# Patient Record
Sex: FEMALE | NOT HISPANIC OR LATINO | URBAN - METROPOLITAN AREA
[De-identification: names, ages, dates, MRNs, and addresses within clinical notes are randomized per-mention and may not be internally consistent; named-entity substitution may affect disease eponyms.]

---

## 2022-07-05 DIAGNOSIS — K62.5 BRBPR (BRIGHT RED BLOOD PER RECTUM): Primary | ICD-10-CM

## 2022-07-05 DIAGNOSIS — R10.9 ABDOMINAL PAIN: ICD-10-CM

## 2022-07-05 DIAGNOSIS — D64.9 ANEMIA: ICD-10-CM

## 2022-09-30 ENCOUNTER — TELEPHONE (OUTPATIENT)
Dept: GASTROENTEROLOGY | Facility: CLINIC | Age: 52
End: 2022-09-30
Payer: COMMERCIAL

## 2022-09-30 NOTE — TELEPHONE ENCOUNTER
----- Message from Debbie Iniguez sent at 9/29/2022  3:38 PM CDT -----  Brianna Summers stated that she's having a lot of stomach issues and need medication called out to her local pharmacy. She said she also need to reschedule her colonoscopy. She would like a call back before the office close today [phone]. Patient said she called previously but haven't heard back from anyone.

## 2022-10-10 ENCOUNTER — OFFICE VISIT (OUTPATIENT)
Dept: GASTROENTEROLOGY | Facility: CLINIC | Age: 52
End: 2022-10-10
Payer: COMMERCIAL

## 2022-10-10 VITALS
DIASTOLIC BLOOD PRESSURE: 85 MMHG | SYSTOLIC BLOOD PRESSURE: 121 MMHG | OXYGEN SATURATION: 95 % | BODY MASS INDEX: 34.39 KG/M2 | WEIGHT: 214 LBS | HEIGHT: 66 IN | HEART RATE: 72 BPM

## 2022-10-10 DIAGNOSIS — R10.9 ABDOMINAL PAIN: ICD-10-CM

## 2022-10-10 DIAGNOSIS — R11.0 NAUSEA: ICD-10-CM

## 2022-10-10 DIAGNOSIS — K21.9 GASTROESOPHAGEAL REFLUX DISEASE, UNSPECIFIED WHETHER ESOPHAGITIS PRESENT: Primary | ICD-10-CM

## 2022-10-10 DIAGNOSIS — R13.19 ESOPHAGEAL DYSPHAGIA: ICD-10-CM

## 2022-10-10 DIAGNOSIS — K92.1 HEMATOCHEZIA: ICD-10-CM

## 2022-10-10 DIAGNOSIS — R10.13 EPIGASTRIC PAIN: ICD-10-CM

## 2022-10-10 DIAGNOSIS — D64.9 ANEMIA: ICD-10-CM

## 2022-10-10 DIAGNOSIS — K62.5 BRBPR (BRIGHT RED BLOOD PER RECTUM): ICD-10-CM

## 2022-10-10 DIAGNOSIS — Z87.11 HISTORY OF PEPTIC ULCER DISEASE: ICD-10-CM

## 2022-10-10 DIAGNOSIS — Z79.1 NSAID LONG-TERM USE: ICD-10-CM

## 2022-10-10 DIAGNOSIS — R10.12 LEFT UPPER QUADRANT PAIN: ICD-10-CM

## 2022-10-10 PROCEDURE — 1160F RVW MEDS BY RX/DR IN RCRD: CPT | Mod: CPTII,S$GLB,, | Performed by: INTERNAL MEDICINE

## 2022-10-10 PROCEDURE — 3074F SYST BP LT 130 MM HG: CPT | Mod: CPTII,S$GLB,, | Performed by: INTERNAL MEDICINE

## 2022-10-10 PROCEDURE — 3079F DIAST BP 80-89 MM HG: CPT | Mod: CPTII,S$GLB,, | Performed by: INTERNAL MEDICINE

## 2022-10-10 PROCEDURE — 3074F PR MOST RECENT SYSTOLIC BLOOD PRESSURE < 130 MM HG: ICD-10-PCS | Mod: CPTII,S$GLB,, | Performed by: INTERNAL MEDICINE

## 2022-10-10 PROCEDURE — 1159F MED LIST DOCD IN RCRD: CPT | Mod: CPTII,S$GLB,, | Performed by: INTERNAL MEDICINE

## 2022-10-10 PROCEDURE — 99214 OFFICE O/P EST MOD 30 MIN: CPT | Mod: S$GLB,,, | Performed by: INTERNAL MEDICINE

## 2022-10-10 PROCEDURE — 3079F PR MOST RECENT DIASTOLIC BLOOD PRESSURE 80-89 MM HG: ICD-10-PCS | Mod: CPTII,S$GLB,, | Performed by: INTERNAL MEDICINE

## 2022-10-10 PROCEDURE — 3008F PR BODY MASS INDEX (BMI) DOCUMENTED: ICD-10-PCS | Mod: CPTII,S$GLB,, | Performed by: INTERNAL MEDICINE

## 2022-10-10 PROCEDURE — 3008F BODY MASS INDEX DOCD: CPT | Mod: CPTII,S$GLB,, | Performed by: INTERNAL MEDICINE

## 2022-10-10 PROCEDURE — 99214 PR OFFICE/OUTPT VISIT, EST, LEVL IV, 30-39 MIN: ICD-10-PCS | Mod: S$GLB,,, | Performed by: INTERNAL MEDICINE

## 2022-10-10 PROCEDURE — 1160F PR REVIEW ALL MEDS BY PRESCRIBER/CLIN PHARMACIST DOCUMENTED: ICD-10-PCS | Mod: CPTII,S$GLB,, | Performed by: INTERNAL MEDICINE

## 2022-10-10 PROCEDURE — 1159F PR MEDICATION LIST DOCUMENTED IN MEDICAL RECORD: ICD-10-PCS | Mod: CPTII,S$GLB,, | Performed by: INTERNAL MEDICINE

## 2022-10-10 RX ORDER — ESOMEPRAZOLE MAGNESIUM 40 MG/1
40 CAPSULE, DELAYED RELEASE ORAL
COMMUNITY
End: 2022-12-06

## 2022-10-10 RX ORDER — FAMOTIDINE 20 MG/1
20 TABLET, FILM COATED ORAL 2 TIMES DAILY
COMMUNITY
Start: 2022-09-13 | End: 2022-12-06

## 2022-10-10 RX ORDER — PROPRANOLOL HYDROCHLORIDE 20 MG/1
20 TABLET ORAL 2 TIMES DAILY
COMMUNITY
Start: 2022-07-13

## 2022-10-10 RX ORDER — ALBUTEROL SULFATE 90 UG/1
AEROSOL, METERED RESPIRATORY (INHALATION)
COMMUNITY
Start: 2022-09-28

## 2022-10-10 RX ORDER — TERBINAFINE HYDROCHLORIDE 250 MG/1
TABLET ORAL
COMMUNITY
Start: 2022-08-09 | End: 2023-09-26

## 2022-10-10 RX ORDER — SPIRONOLACTONE 25 MG/1
TABLET ORAL
COMMUNITY
Start: 2022-10-08

## 2022-10-10 RX ORDER — HYDROGEN PEROXIDE 3 %
40 SOLUTION, NON-ORAL MISCELLANEOUS 2 TIMES DAILY
COMMUNITY
End: 2022-10-10

## 2022-10-10 NOTE — LETTER
October 10, 2022        Ivania Jackson MD  [address] [facility] [address]             [facility]  [address] [place] [address]  Phone: [phone]  Fax: [phone]   Patient: Brianna Summers   MR Number: [id number]   YOB: 1970   Date of Visit: 10/10/2022       Dear Dr. Jackson:    Thank you for referring Brianna Summers to me for evaluation. Attached you will find relevant portions of my assessment and plan of care.    If you have questions, please do not hesitate to call me. I look forward to following Brianna Summers along with you.    Sincerely,      Izabela Carroll MD            CC  No Recipients    Enclosure

## 2022-10-10 NOTE — PATIENT INSTRUCTIONS
Schedule upper and lower endoscopies. Notify our office if you need a new colonoscopy prep sent to pharmacy.     Please notify my office if you have not been contacted within two weeks after any procedures, submitting any samples (biopsies, blood, stool, urine, etc.) or after any imaging (X-ray, CT, MRI, etc.).

## 2022-10-10 NOTE — PROGRESS NOTES
Clinic Note    Reason for visit:  The primary encounter diagnosis was Gastroesophageal reflux disease, unspecified whether esophagitis present. Diagnoses of Left upper quadrant pain, Hematochezia, Epigastric pain, Nausea, History of peptic ulcer disease, NSAID long-term use, and Esophageal dysphagia were also pertinent to this visit.    PCP: Ivania Jackson       HPI:  This is a 52 y.o. female who was last seen 3/2021. Patient reports having LUQ tenderness last week that has resolved. Patient takes excedrin and ibuprofen, 1 of each daily for migraines. She states h/o PUD. She also reports if she does not eat by a certain time she will have nausea. She takes esomeprazole 40 daily and famotidine 20 BID. If misses a day of esomeprazole 40, she will have symptoms. She also reports intermittent dysphagia with food. Last EGD/Colonoscopy many years ago. No diarrhea or constipation.    Had GB removed ~25 years ago and GI issues improved (nausea/vomiting after eating).  Had a bowel blockage [id number] and had part of her colon removed. Having rectal bleeding for years.     Referral notes from 9/2021: [facility]. Anemia. Multiple colons in past. Bowel obs, 2005, s/p partial colon resection. H/o bleeding . 9/2021 B12/fol/LD/retic/CBC/chol nl, Hb 12.3 < 11.8L, MCV 95.4.    Review of Systems   Constitutional:  Negative for chills, diaphoresis, fatigue, fever and unexpected weight change.   HENT:  Positive for sore throat and trouble swallowing. Negative for mouth sores, nosebleeds, postnasal drip and voice change.    Eyes:  Negative for pain, discharge and eye dryness.   Respiratory:  Positive for chest tightness, shortness of breath and wheezing. Negative for apnea, cough and choking.    Cardiovascular:  Positive for leg swelling and claudication. Negative for chest pain and palpitations.   Gastrointestinal:  Positive for abdominal distention, abdominal pain, anal bleeding, blood in stool, nausea and reflux. Negative for change in  bowel habit, constipation, diarrhea, rectal pain, vomiting, fecal incontinence and change in bowel habit.   Genitourinary:  Negative for bladder incontinence, difficulty urinating, dysuria, flank pain, frequency and hematuria.   Musculoskeletal:  Positive for joint swelling. Negative for arthralgias, back pain and joint deformity.   Integumentary:  Positive for color change. Negative for rash and wound.   Allergic/Immunologic: Negative for environmental allergies and food allergies.   Neurological:  Positive for headaches. Negative for seizures, facial asymmetry, speech difficulty, weakness and memory loss.   Hematological:  Negative for adenopathy. Does not bruise/bleed easily.   Psychiatric/Behavioral:  Negative for agitation, behavioral problems, confusion, hallucinations and sleep disturbance.       Past Medical History:   Diagnosis Date    Bleeding ulcer     BMI 34.0-34.9,adult     Bowel obstruction     GERD (gastroesophageal reflux disease)     High blood pressure     Migraines      Past Surgical History:   Procedure Laterality Date     SECTION      twice    CHOLECYSTECTOMY      COLECTOMY  2005    partial, bowel obstruction    COLONOSCOPY      HYSTERECTOMY      TONSILLECTOMY AND ADENOIDECTOMY      UPPER GASTROINTESTINAL ENDOSCOPY       Family History   Problem Relation Age of Onset    Throat cancer Father     Lung cancer Father     Esophageal cancer Father     Ulcerative colitis Sister     Crohn's disease Maternal Aunt     Liver cancer Maternal Grandmother     Tuberculosis Maternal Grandfather     Colon cancer Neg Hx      Social History     Tobacco Use    Smoking status: Never    Smokeless tobacco: Never   Substance Use Topics    Alcohol use: Never    Drug use: Never     Review of patient's allergies indicates:   Allergen Reactions    Imitrex [sumatriptan]         Medication List with Changes/Refills   Current Medications    ALBUTEROL (PROVENTIL/VENTOLIN HFA) 90 MCG/ACTUATION INHALER         "ESOMEPRAZOLE (NEXIUM) 40 MG CAPSULE    Take 40 mg by mouth before breakfast.    FAMOTIDINE (PEPCID) 20 MG TABLET    Take 20 mg by mouth 2 (two) times a day.    PROPRANOLOL (INDERAL) 20 MG TABLET    Take 20 mg by mouth 2 (two) times daily.    SPIRONOLACTONE (ALDACTONE) 25 MG TABLET        TERBINAFINE HCL (LAMISIL) 250 MG TABLET    TTAKE 1 TABLET BY MOUTH DAILY   Discontinued Medications    ESOMEPRAZOLE (NEXIUM) 20 MG CAPSULE    Take 40 mg by mouth 2 (two) times daily.         Vital Signs:  /85   Pulse 72   Ht 5' 6" (1.676 m)   Wt 97.1 kg (214 lb)   SpO2 95%   BMI 34.54 kg/m²         Physical Exam  Vitals reviewed.   Constitutional:       General: She is awake. She is not in acute distress.     Appearance: Normal appearance. She is well-developed. She is not ill-appearing, toxic-appearing or diaphoretic.   HENT:      Head: Normocephalic and atraumatic.      Nose: Nose normal.      Mouth/Throat:      Mouth: Mucous membranes are moist.      Pharynx: Oropharynx is clear. No oropharyngeal exudate or posterior oropharyngeal erythema.   Eyes:      General: Lids are normal. Gaze aligned appropriately. No scleral icterus.        Right eye: No discharge.         Left eye: No discharge.      Extraocular Movements: Extraocular movements intact.      Conjunctiva/sclera: Conjunctivae normal.   Neck:      Trachea: Trachea normal.   Cardiovascular:      Rate and Rhythm: Normal rate and regular rhythm.      Pulses:           Radial pulses are 2+ on the right side and 2+ on the left side.   Pulmonary:      Effort: Pulmonary effort is normal. No respiratory distress.      Breath sounds: Normal breath sounds. No stridor. No wheezing or rhonchi.   Chest:      Chest wall: No tenderness.   Abdominal:      General: Bowel sounds are normal. There is no distension.      Palpations: Abdomen is soft. There is no fluid wave, hepatomegaly or mass.      Tenderness: There is no abdominal tenderness. There is no guarding or rebound. "   Musculoskeletal:         General: No tenderness or deformity.      Cervical back: Full passive range of motion without pain and neck supple. No tenderness.      Right lower leg: No edema.      Left lower leg: No edema.   Lymphadenopathy:      Cervical: No cervical adenopathy.   Skin:     General: Skin is warm and dry.      Capillary Refill: Capillary refill takes less than 2 seconds.      Coloration: Skin is not cyanotic, jaundiced or pale.      Findings: No rash.   Neurological:      General: No focal deficit present.      Mental Status: She is alert and oriented to person, place, and time.      Cranial Nerves: No facial asymmetry.      Motor: No tremor.   Psychiatric:         Attention and Perception: Attention normal.         Mood and Affect: Mood and affect normal.         Speech: Speech normal.         Behavior: Behavior normal. Behavior is cooperative.          All of the data above and below has been reviewed by myself and any further interpretations will be reflected in the assessment and plan.   The data includes review of external notes, and independent interpretation of lab results, procedures, x-rays, and imaging reports.      Assessment:  Gastroesophageal reflux disease, unspecified whether esophagitis present    Left upper quadrant pain    Hematochezia  -     Ambulatory Referral to External Surgery    Epigastric pain    Nausea    History of peptic ulcer disease  -     Ambulatory Referral to External Surgery    NSAID long-term use    Esophageal dysphagia  -     Ambulatory Referral to External Surgery    Ulcer v outlet bleeding v infl v isch v H. Pylori. Plan for EGD/Colon with E/G/D biopsies. Consider EM if EGD unrevealing as source of dysphagia.      Recommendations:  Schedule upper and lower endoscopies at [facility] with sutab. Notify our office if you need a new colonoscopy prep sent to pharmacy.     Risks, benefits, and alternatives of medical management, any associated procedures, and/or treatment  discussed with the patient. Patient given opportunity to ask questions and voices understanding. Patient has elected to proceed with the recommended care modalities as discussed.    Follow up in about 6 months (around 4/10/2023).    Order summary:  Orders Placed This Encounter   Procedures    Ambulatory Referral to External Surgery          Instructed patient to notify my office if they have not been contacted within two weeks after any procedures, submitting any samples (biopsies, blood, stool, urine, etc.) or after any imaging (X-ray, CT, MRI, etc.).     Izabela Carroll MD    This document may have been created using a voice recognition transcribing system. Incorrect words or phrases may have been missed during proofreading. Please interpret accordingly or contact me for clarification.

## 2022-11-30 ENCOUNTER — TELEPHONE (OUTPATIENT)
Dept: GASTROENTEROLOGY | Facility: CLINIC | Age: 52
End: 2022-11-30
Payer: COMMERCIAL

## 2022-11-30 ENCOUNTER — TELEPHONE (OUTPATIENT)
Dept: ADMINISTRATIVE | Facility: CLINIC | Age: 52
End: 2022-11-30
Payer: COMMERCIAL

## 2022-11-30 VITALS — WEIGHT: 214 LBS | HEIGHT: 66 IN | BODY MASS INDEX: 34.39 KG/M2

## 2022-11-30 DIAGNOSIS — K92.1 HEMATOCHEZIA: Primary | ICD-10-CM

## 2022-11-30 DIAGNOSIS — Z87.11 HISTORY OF PEPTIC ULCER DISEASE: ICD-10-CM

## 2022-11-30 DIAGNOSIS — R13.19 ESOPHAGEAL DYSPHAGIA: ICD-10-CM

## 2022-11-30 NOTE — TELEPHONE ENCOUNTER
[facility]  [address] [place] [address]  Phone: [phone]  Fax: [phone]    History & Physical         Provider: Dr. Izabela Carroll    Patient Name: Brianna Summers DOB (age):1970  52 y.o.           Gender: female   Phone: [phone]     Referring Physician: Ivania Jackson     Vital Signs:   Height - 5'6  Weight - 214 lb   BMI -  34.54    Plan: EGD/ Colonoscopy @ [facility]     Encounter Diagnoses   Name Primary?    Hematochezia Yes    History of peptic ulcer disease     Esophageal dysphagia            History:      Past Medical History:   Diagnosis Date    Bleeding ulcer     BMI 34.0-34.9,adult     BMI 34.0-34.9,adult     Bowel obstruction     GERD (gastroesophageal reflux disease)     High blood pressure     Migraines       Past Surgical History:   Procedure Laterality Date     SECTION      twice    CHOLECYSTECTOMY      COLECTOMY      partial, bowel obstruction    COLONOSCOPY      HYSTERECTOMY      TONSILLECTOMY AND ADENOIDECTOMY      UPPER GASTROINTESTINAL ENDOSCOPY        Medication List with Changes/Refills   Current Medications    ALBUTEROL (PROVENTIL/VENTOLIN HFA) 90 MCG/ACTUATION INHALER        ESOMEPRAZOLE (NEXIUM) 40 MG CAPSULE    Take 40 mg by mouth before breakfast.    FAMOTIDINE (PEPCID) 20 MG TABLET    Take 20 mg by mouth 2 (two) times a day.    PROPRANOLOL (INDERAL) 20 MG TABLET    Take 20 mg by mouth 2 (two) times daily.    SPIRONOLACTONE (ALDACTONE) 25 MG TABLET        TERBINAFINE HCL (LAMISIL) 250 MG TABLET    TTAKE 1 TABLET BY MOUTH DAILY      Review of patient's allergies indicates:   Allergen Reactions    Imitrex [sumatriptan]       Family History   Problem Relation Age of Onset    Throat cancer Father     Lung cancer Father     Esophageal cancer Father     Ulcerative colitis Sister     Crohn's disease Maternal Aunt     Liver cancer Maternal Grandmother      Tuberculosis Maternal Grandfather     Colon cancer Neg Hx       Social History     Tobacco Use    Smoking status: Never    Smokeless tobacco: Never   Substance Use Topics    Alcohol use: Never    Drug use: Never        Physical Examination:     General Appearance:___________________________  HEENT: _____________________________________  Abdomen:____________________________________  Heart:________________________________________  Lungs:_______________________________________  Extremities:___________________________________  Skin:_________________________________________  Endocrine:____________________________________  Genitourinary:_________________________________  Neurological:__________________________________      Patient has been evaluated immediately prior to sedation and is medically cleared for endoscopy with IVCS as an ASA class: ______      Physician Signature: _________________________       Date: ________  Time: ________

## 2022-12-06 ENCOUNTER — TELEPHONE (OUTPATIENT)
Dept: GASTROENTEROLOGY | Facility: CLINIC | Age: 52
End: 2022-12-06

## 2022-12-06 ENCOUNTER — OUTSIDE PLACE OF SERVICE (OUTPATIENT)
Dept: GASTROENTEROLOGY | Facility: CLINIC | Age: 52
End: 2022-12-06

## 2022-12-06 DIAGNOSIS — K21.9 GASTROESOPHAGEAL REFLUX DISEASE, UNSPECIFIED WHETHER ESOPHAGITIS PRESENT: Primary | ICD-10-CM

## 2022-12-06 LAB
CRC RECOMMENDATION EXT: NORMAL
EGD FOLLOW UP EXTERNAL: NORMAL

## 2022-12-06 PROCEDURE — 45378 DIAGNOSTIC COLONOSCOPY: CPT | Mod: ,,, | Performed by: INTERNAL MEDICINE

## 2022-12-06 PROCEDURE — 43239 EGD BIOPSY SINGLE/MULTIPLE: CPT | Mod: ,,, | Performed by: INTERNAL MEDICINE

## 2022-12-06 PROCEDURE — 45378 PR COLONOSCOPY,DIAGNOSTIC: ICD-10-PCS | Mod: ,,, | Performed by: INTERNAL MEDICINE

## 2022-12-06 PROCEDURE — 43239 PR EGD, FLEX, W/BIOPSY, SGL/MULTI: ICD-10-PCS | Mod: ,,, | Performed by: INTERNAL MEDICINE

## 2022-12-06 RX ORDER — PANTOPRAZOLE SODIUM 40 MG/1
40 TABLET, DELAYED RELEASE ORAL 2 TIMES DAILY
Qty: 180 TABLET | Refills: 2 | Status: SHIPPED | OUTPATIENT
Start: 2022-12-06

## 2022-12-07 LAB — SPECIMEN TO PATHOLOGY: NORMAL

## 2022-12-09 ENCOUNTER — TELEPHONE (OUTPATIENT)
Dept: GASTROENTEROLOGY | Facility: CLINIC | Age: 52
End: 2022-12-09
Payer: COMMERCIAL

## 2022-12-09 NOTE — TELEPHONE ENCOUNTER
LVM with procedure results and medication use.   Also let pt know that someone will be reaching out to her to get her scheduled for EGD @ [facility] with NBP.

## 2022-12-09 NOTE — TELEPHONE ENCOUNTER
DBx mini act ditis, GBx react w/mini chr inact gitis w/o Hp, EBx reflux.  Notify patient. No signs of infection, precancerous cells or BE on the upper endoscopy biopsies.  She was to attempt to stop NSAID use and begin panto 40 BID (no need for esome or pepcid on panto BID).  Repeat EGD next available at [facility].  NBP

## 2022-12-14 ENCOUNTER — TELEPHONE (OUTPATIENT)
Dept: GASTROENTEROLOGY | Facility: CLINIC | Age: 52
End: 2022-12-14
Payer: COMMERCIAL

## 2022-12-14 NOTE — TELEPHONE ENCOUNTER
Called pt to reschedule her EGD/Colon and pt stated that she has already had it. I apologized for the miscommunication. valeria

## 2022-12-14 NOTE — TELEPHONE ENCOUNTER
----- Message from Vale Zhou LPN sent at 11/7/2022  4:15 PM CST -----  Regarding: FW: Cancellation  Contact: patient    ----- Message -----  From: Apple Massey  Sent: 11/7/2022   3:45 PM CST  To: [facility]OWE[facility] Staff  Subject: Cancellation                                     Per phone call with patient, she stated that she would like to cancel the appointment for the colonoscopy on 12/13/2022 and she will call back and reschedule.  Please return call at [phone] (work).    Thanks,  SJ

## 2022-12-15 NOTE — TELEPHONE ENCOUNTER
Reached out to patient l/m for her to call back to get scheduled for a egd @ [facility]. [place]

## 2023-01-30 ENCOUNTER — DOCUMENTATION ONLY (OUTPATIENT)
Dept: GASTROENTEROLOGY | Facility: CLINIC | Age: 53
End: 2023-01-30
Payer: COMMERCIAL

## 2023-09-26 ENCOUNTER — OFFICE VISIT (OUTPATIENT)
Dept: OBSTETRICS AND GYNECOLOGY | Facility: CLINIC | Age: 53
End: 2023-09-26
Payer: COMMERCIAL

## 2023-09-26 VITALS
HEIGHT: 66 IN | WEIGHT: 218 LBS | SYSTOLIC BLOOD PRESSURE: 149 MMHG | BODY MASS INDEX: 35.03 KG/M2 | HEART RATE: 73 BPM | DIASTOLIC BLOOD PRESSURE: 89 MMHG

## 2023-09-26 DIAGNOSIS — N76.2 ACUTE VULVITIS: Primary | ICD-10-CM

## 2023-09-26 PROCEDURE — 3077F PR MOST RECENT SYSTOLIC BLOOD PRESSURE >= 140 MM HG: ICD-10-PCS | Mod: CPTII,S$GLB,, | Performed by: OBSTETRICS & GYNECOLOGY

## 2023-09-26 PROCEDURE — 3008F BODY MASS INDEX DOCD: CPT | Mod: CPTII,S$GLB,, | Performed by: OBSTETRICS & GYNECOLOGY

## 2023-09-26 PROCEDURE — 3079F PR MOST RECENT DIASTOLIC BLOOD PRESSURE 80-89 MM HG: ICD-10-PCS | Mod: CPTII,S$GLB,, | Performed by: OBSTETRICS & GYNECOLOGY

## 2023-09-26 PROCEDURE — 99203 PR OFFICE/OUTPT VISIT, NEW, LEVL III, 30-44 MIN: ICD-10-PCS | Mod: S$GLB,,, | Performed by: OBSTETRICS & GYNECOLOGY

## 2023-09-26 PROCEDURE — 3079F DIAST BP 80-89 MM HG: CPT | Mod: CPTII,S$GLB,, | Performed by: OBSTETRICS & GYNECOLOGY

## 2023-09-26 PROCEDURE — 3008F PR BODY MASS INDEX (BMI) DOCUMENTED: ICD-10-PCS | Mod: CPTII,S$GLB,, | Performed by: OBSTETRICS & GYNECOLOGY

## 2023-09-26 PROCEDURE — 99203 OFFICE O/P NEW LOW 30 MIN: CPT | Mod: S$GLB,,, | Performed by: OBSTETRICS & GYNECOLOGY

## 2023-09-26 PROCEDURE — 3077F SYST BP >= 140 MM HG: CPT | Mod: CPTII,S$GLB,, | Performed by: OBSTETRICS & GYNECOLOGY

## 2023-09-26 RX ORDER — NYSTATIN AND TRIAMCINOLONE ACETONIDE 100000; 1 [USP'U]/G; MG/G
CREAM TOPICAL
Qty: 30 G | Refills: 3 | Status: SHIPPED | OUTPATIENT
Start: 2023-09-26 | End: 2024-09-25

## 2023-09-26 RX ORDER — ESTRADIOL 0.1 MG/G
1 CREAM VAGINAL DAILY
Qty: 42.5 G | Refills: 3 | Status: SHIPPED | OUTPATIENT
Start: 2023-09-26 | End: 2024-09-25

## 2023-09-26 RX ORDER — FLUCONAZOLE 150 MG/1
150 TABLET ORAL DAILY
Qty: 3 TABLET | Refills: 0 | Status: SHIPPED | OUTPATIENT
Start: 2023-09-26 | End: 2023-10-29

## 2023-09-26 NOTE — PROGRESS NOTES
Subjective     Patient ID: Brianna Summers is a 53 y.o. female.    Chief Complaint: Vaginal Pain (Blisters, irritation)    Is a 53-year-old female here for evaluation of irritation on her vulva said is begun couple months ago said it is so painful she can not have intercourse at times and swells and cracks sees white areas a often on patient denies any history of diabetes however whole family is diabetic spent 30 minutes with the patient face-to-face discussing her problem and diagnosing her problem    Vaginal Pain  The patient's primary symptoms include pelvic pain.     Review of Systems   Genitourinary:  Positive for genital sores, pelvic pain and vaginal pain.          Objective     Physical Exam  Constitutional:       Appearance: She is well-developed.   HENT:      Head: Normocephalic.      Nose: Nose normal.   Eyes:      Conjunctiva/sclera: Conjunctivae normal.      Pupils: Pupils are equal, round, and reactive to light.   Cardiovascular:      Rate and Rhythm: Normal rate and regular rhythm.      Heart sounds: Normal heart sounds.   Pulmonary:      Effort: Pulmonary effort is normal.      Breath sounds: Normal breath sounds.   Abdominal:      General: Bowel sounds are normal.      Palpations: Abdomen is soft.   Genitourinary:     Vagina: Normal.          Comments: A whole vulva is erythematous there is some agglutination of the labia minora to the majora and also the periclitoral area upper vagina is normal  Musculoskeletal:         General: Normal range of motion.      Cervical back: Normal range of motion and neck supple.   Skin:     General: Skin is warm and dry.   Neurological:      Mental Status: She is alert and oriented to person, place, and time.   Psychiatric:         Behavior: Behavior normal.         Thought Content: Thought content normal.          Assessment and Plan     1. Acute vulvitis  -     TSH w/reflex to FT4; Future; Expected date: 09/26/2023  -     Glucose, Random; Future; Expected  date: 09/26/2023             Will give the patient 3 Diflucan 1 every 3 days Mycolog b.i.d. and estradiol cream HS    No follow-ups on file.

## 2023-09-27 DIAGNOSIS — N76.2 ACUTE VULVITIS: Primary | ICD-10-CM

## 2023-09-28 ENCOUNTER — TELEPHONE (OUTPATIENT)
Dept: OBSTETRICS AND GYNECOLOGY | Facility: CLINIC | Age: 53
End: 2023-09-28
Payer: COMMERCIAL

## 2023-09-28 NOTE — TELEPHONE ENCOUNTER
----- Message from Candelaria Stewart sent at 9/27/2023  4:39 PM CDT -----  Contact: self  Type:  Patient Returning Call    Who Called:Brianna Summers   Who Left Message for Patient: She's unsure  Does the patient know what this is regarding?: Possibly about medication needing authorization  Would the patient rather a call back or a response via [facility]? Call back  Best Call Back Number: [phone]   Additional Information: n/a

## 2023-10-03 RX ORDER — NYSTATIN 100000 U/G
CREAM TOPICAL 2 TIMES DAILY
Qty: 30 G | Refills: 0 | Status: SHIPPED | OUTPATIENT
Start: 2023-10-03

## 2023-10-06 LAB
GLUCOSE: 99 MG/DL (ref 74–106)
TSH W/REFLEX TO FT4: 0.63 UIU/ML (ref 0.27–4.2)

## 2023-10-17 ENCOUNTER — OFFICE VISIT (OUTPATIENT)
Dept: OBSTETRICS AND GYNECOLOGY | Facility: CLINIC | Age: 53
End: 2023-10-17
Payer: COMMERCIAL

## 2023-10-17 VITALS
DIASTOLIC BLOOD PRESSURE: 76 MMHG | HEART RATE: 65 BPM | BODY MASS INDEX: 35.19 KG/M2 | WEIGHT: 218 LBS | SYSTOLIC BLOOD PRESSURE: 115 MMHG

## 2023-10-17 DIAGNOSIS — N76.2 ACUTE VULVITIS: Primary | ICD-10-CM

## 2023-10-17 PROCEDURE — 3078F PR MOST RECENT DIASTOLIC BLOOD PRESSURE < 80 MM HG: ICD-10-PCS | Mod: CPTII,S$GLB,, | Performed by: OBSTETRICS & GYNECOLOGY

## 2023-10-17 PROCEDURE — 3074F PR MOST RECENT SYSTOLIC BLOOD PRESSURE < 130 MM HG: ICD-10-PCS | Mod: CPTII,S$GLB,, | Performed by: OBSTETRICS & GYNECOLOGY

## 2023-10-17 PROCEDURE — 1159F PR MEDICATION LIST DOCUMENTED IN MEDICAL RECORD: ICD-10-PCS | Mod: CPTII,S$GLB,, | Performed by: OBSTETRICS & GYNECOLOGY

## 2023-10-17 PROCEDURE — 1159F MED LIST DOCD IN RCRD: CPT | Mod: CPTII,S$GLB,, | Performed by: OBSTETRICS & GYNECOLOGY

## 2023-10-17 PROCEDURE — 3074F SYST BP LT 130 MM HG: CPT | Mod: CPTII,S$GLB,, | Performed by: OBSTETRICS & GYNECOLOGY

## 2023-10-17 PROCEDURE — 99213 OFFICE O/P EST LOW 20 MIN: CPT | Mod: S$GLB,,, | Performed by: OBSTETRICS & GYNECOLOGY

## 2023-10-17 PROCEDURE — 3078F DIAST BP <80 MM HG: CPT | Mod: CPTII,S$GLB,, | Performed by: OBSTETRICS & GYNECOLOGY

## 2023-10-17 PROCEDURE — 99213 PR OFFICE/OUTPT VISIT, EST, LEVL III, 20-29 MIN: ICD-10-PCS | Mod: S$GLB,,, | Performed by: OBSTETRICS & GYNECOLOGY

## 2023-10-17 PROCEDURE — 3008F PR BODY MASS INDEX (BMI) DOCUMENTED: ICD-10-PCS | Mod: CPTII,S$GLB,, | Performed by: OBSTETRICS & GYNECOLOGY

## 2023-10-17 PROCEDURE — 3008F BODY MASS INDEX DOCD: CPT | Mod: CPTII,S$GLB,, | Performed by: OBSTETRICS & GYNECOLOGY

## 2023-10-17 NOTE — PROGRESS NOTES
Patient is here follow-up significant vulvitis she said she feels 100% better just has 1 area that is still little irritated on exam she has a little tiny fissure Autumn clitoral other net vulvitis completely gone I asked her to change at least a soap S this the only thing she is using has this responded like a chronic

## 2024-07-29 DIAGNOSIS — K21.9 GASTROESOPHAGEAL REFLUX DISEASE, UNSPECIFIED WHETHER ESOPHAGITIS PRESENT: ICD-10-CM

## 2024-08-01 RX ORDER — PANTOPRAZOLE SODIUM 40 MG/1
40 TABLET, DELAYED RELEASE ORAL 2 TIMES DAILY
Qty: 180 TABLET | Refills: 2 | OUTPATIENT
Start: 2024-08-01

## 2024-08-01 NOTE — TELEPHONE ENCOUNTER
"Staff spoke to pt.. I advised her we received the rf req for her medication but she haven't been seen since 2022.. I asked her was she still taking the med and she stated "yes".    I asked her who was refilling the medication and she said "Ashkan gave me a year refill on it".  I advised her none of the scripts are refilled for a year. She then stated "maybe my other dr refilled it"  I advised her if she needed an appt if she wanted the medication refilled.. she said "no I dont need and appt"  I told her ok so you are saying you dont want to be seen anymore? She stated "no I dont want an appt"  I said ok thank you have a good day.   "

## 2024-10-15 ENCOUNTER — TELEPHONE (OUTPATIENT)
Dept: GASTROENTEROLOGY | Facility: CLINIC | Age: 54
End: 2024-10-15
Payer: COMMERCIAL

## 2024-10-15 NOTE — TELEPHONE ENCOUNTER
----- Message from Maria Del Rosario sent at 10/15/2024  8:48 AM CDT -----  States she needs to schedule an EGD and Colonoscopy. Please call pt  [phone]. Thank you

## 2024-10-15 NOTE — TELEPHONE ENCOUNTER
Called and spoke with patient. OV for egd/colonoscopy consult scheduled on 12/18/2024 at 3:00 pm w/ [facility]. DMP

## 2024-12-18 ENCOUNTER — OFFICE VISIT (OUTPATIENT)
Dept: GASTROENTEROLOGY | Facility: CLINIC | Age: 54
End: 2024-12-18
Payer: COMMERCIAL

## 2024-12-18 VITALS
WEIGHT: 207.81 LBS | SYSTOLIC BLOOD PRESSURE: 118 MMHG | OXYGEN SATURATION: 96 % | BODY MASS INDEX: 33.4 KG/M2 | HEART RATE: 73 BPM | HEIGHT: 66 IN | DIASTOLIC BLOOD PRESSURE: 74 MMHG

## 2024-12-18 DIAGNOSIS — R10.13 EPIGASTRIC PAIN: ICD-10-CM

## 2024-12-18 DIAGNOSIS — D64.9 ANEMIA, UNSPECIFIED TYPE: ICD-10-CM

## 2024-12-18 DIAGNOSIS — K25.9 GASTRIC ULCER, UNSPECIFIED CHRONICITY, UNSPECIFIED WHETHER GASTRIC ULCER HEMORRHAGE OR PERFORATION PRESENT: Primary | ICD-10-CM

## 2024-12-18 DIAGNOSIS — K21.9 GASTROESOPHAGEAL REFLUX DISEASE, UNSPECIFIED WHETHER ESOPHAGITIS PRESENT: ICD-10-CM

## 2024-12-18 DIAGNOSIS — Z12.11 SCREENING FOR COLON CANCER: ICD-10-CM

## 2024-12-18 DIAGNOSIS — Z79.1 NSAID LONG-TERM USE: ICD-10-CM

## 2024-12-18 PROCEDURE — 1159F MED LIST DOCD IN RCRD: CPT | Mod: CPTII,,,

## 2024-12-18 PROCEDURE — 1160F RVW MEDS BY RX/DR IN RCRD: CPT | Mod: CPTII,,,

## 2024-12-18 PROCEDURE — 3008F BODY MASS INDEX DOCD: CPT | Mod: CPTII,,,

## 2024-12-18 PROCEDURE — 3078F DIAST BP <80 MM HG: CPT | Mod: CPTII,,,

## 2024-12-18 PROCEDURE — 3074F SYST BP LT 130 MM HG: CPT | Mod: CPTII,,,

## 2024-12-18 PROCEDURE — 99215 OFFICE O/P EST HI 40 MIN: CPT | Mod: S$PBB,,,

## 2024-12-18 RX ORDER — FAMOTIDINE 20 MG/1
1 TABLET, FILM COATED ORAL DAILY
COMMUNITY
Start: 2024-09-30 | End: 2024-12-18 | Stop reason: SDUPTHER

## 2024-12-18 RX ORDER — FUROSEMIDE 40 MG/1
1 TABLET ORAL DAILY
COMMUNITY
Start: 2024-09-30

## 2024-12-18 RX ORDER — FAMOTIDINE 20 MG/1
20 TABLET, FILM COATED ORAL DAILY
Qty: 90 TABLET | Refills: 4 | Status: SHIPPED | OUTPATIENT
Start: 2024-12-18

## 2024-12-18 RX ORDER — IBUPROFEN 200 MG
200 TABLET ORAL EVERY 6 HOURS PRN
COMMUNITY

## 2024-12-18 RX ORDER — PANTOPRAZOLE SODIUM 40 MG/1
40 TABLET, DELAYED RELEASE ORAL 2 TIMES DAILY
Qty: 180 TABLET | Refills: 4 | Status: SHIPPED | OUTPATIENT
Start: 2024-12-18

## 2024-12-18 NOTE — PATIENT INSTRUCTIONS
Schedule upper endoscopy.   Continue pantoprazole 40 mg twice a day. Continue famotidine 20 mg daily.  Discontinue all NSAIDs.    Please notify my office if you have not been contacted within two weeks after any procedures, submitting any samples (biopsies, blood, stool, urine, etc.) or after any imaging (X-ray, CT, MRI, etc.).

## 2024-12-18 NOTE — PROGRESS NOTES
Clinic Note    Reason for visit:  The primary encounter diagnosis was Gastric ulcer, unspecified chronicity, unspecified whether gastric ulcer hemorrhage or perforation present. Diagnoses of Gastroesophageal reflux disease, unspecified whether esophagitis present, Epigastric pain, Anemia, unspecified type, Screening for colon cancer, and NSAID long-term use were also pertinent to this visit.    PCP: Ivania Jackson       HPI:  This is a 54 y.o. female who was last seen by Dr. Carroll in 2022. Patient with h/o PUD. She has 2 gastric ulcers on EGD 2022. She was taking NSAIDs at that time for migraines and joint pain. She was on esomeprazole 40 mg daily with Pepcid and this was changed to pantoprazole 40 mg BID after EGD. She is still taking pantoprazole 40 mg but has been taking once daily more lately due to it not getting refilled. She takes famotidine 20 mg daily as well. This controls reflux. Has symptoms if misses one day. If she does not take reflux medicine she will also have pain in epigastric area. She has not had dark stools in a long time.   She did not have a repeat EGD. She is taking ibuprofen 400 mg BID at this time for joint pain. About 90% of the time she she is taking the ibuprofen but does go some days without it. Waiting to see Dr. Rodriguez for follow up on rheum work up.       11/26/2024: Hgb 11.9L, MCV 94, PLT 407H, Eos 7%H, CBC onl, ANCA/HCVAb/TB/HBVsAg/HBVsAb/HBVcAb neg, CRP 10.1H, ESR 21, CMP wnl    EGD/Colonoscopy 12/6/2022: Small HH, 2 gastric ulcers. DBx mini act ditis, GBx react w/mini chr inact gitis w/o Hp, EBx reflux. IH/EH. Normal TI and colonic mucosa otherwise. Repeat colonoscopy in 10 years.    Had GB removed ~25 years ago and GI issues improved (nausea/vomiting after eating).  Had a bowel blockage 8712-1738 and had part of her colon removed. Having rectal bleeding for years.      Referral notes from 9/2021: [facility]. Anemia. Multiple colons in past. Bowel obs, 2005, s/p partial colon  resection. H/o bleeding . 2021 B12/fol/LD/retic/CBC/chol nl, Hb 12.3 < 11.8L, MCV 95.4.    Review of Systems   Constitutional:  Negative for fatigue, fever and unexpected weight change.   HENT:  Negative for mouth sores, postnasal drip, sore throat and trouble swallowing.    Eyes:  Negative for pain, discharge and eye dryness.   Respiratory:  Negative for apnea, cough, choking, chest tightness, shortness of breath and wheezing.    Cardiovascular:  Positive for leg swelling. Negative for chest pain and palpitations.   Gastrointestinal:  Positive for abdominal distention, abdominal pain and nausea. Negative for anal bleeding, blood in stool, change in bowel habit, constipation, diarrhea, rectal pain, vomiting, reflux and fecal incontinence.   Genitourinary:  Negative for bladder incontinence, dysuria and hematuria.   Musculoskeletal:  Positive for back pain and joint swelling. Negative for arthralgias.   Integumentary:  Positive for color change and rash.   Allergic/Immunologic: Negative for environmental allergies and food allergies.   Neurological:  Positive for headaches. Negative for seizures.   Hematological:  Negative for adenopathy. Does not bruise/bleed easily.        Past Medical History:   Diagnosis Date    Asthma     CHF (congestive heart failure)     GERD (gastroesophageal reflux disease)     High blood pressure     History of pericarditis     Hyperparathyroidism, unspecified     Migraines     Neuropathy     Obesity, Class I, BMI 30.0-34.9 (see actual BMI)     Osteopenia     Peripheral vascular disease, unspecified     Rheumatoid arthritis, unspecified     Scoliosis     Unspecified osteoarthritis, unspecified site      Past Surgical History:   Procedure Laterality Date     SECTION      twice    CHOLECYSTECTOMY      COLECTOMY  2005    partial, bowel obstruction    COLONOSCOPY      HYSTERECTOMY      KNEE CARTILAGE SURGERY Left     TONSILLECTOMY AND ADENOIDECTOMY      UPPER GASTROINTESTINAL  "ENDOSCOPY      VENOUS ABLATION       Family History   Problem Relation Name Age of Onset    Throat cancer Father      Lung cancer Father      Esophageal cancer Father      Ulcerative colitis Sister      Crohn's disease Maternal Aunt      Liver cancer Maternal Grandmother      Tuberculosis Maternal Grandfather      Colon cancer Neg Hx       Social History     Tobacco Use    Smoking status: Never    Smokeless tobacco: Never   Substance Use Topics    Alcohol use: Never    Drug use: Never     Review of patient's allergies indicates:   Allergen Reactions    Imitrex [sumatriptan]       Medication List with Changes/Refills   Current Medications    ALBUTEROL (PROVENTIL/VENTOLIN HFA) 90 MCG/ACTUATION INHALER        ESTRADIOL (ESTRACE) 0.01 % (0.1 MG/GRAM) VAGINAL CREAM    Place 1 g vaginally once daily.    FUROSEMIDE (LASIX) 40 MG TABLET    Take 1 tablet by mouth once daily.    IBUPROFEN (ADVIL,MOTRIN) 200 MG TABLET    Take 200 mg by mouth every 6 (six) hours as needed.    NYSTATIN-TRIAMCINOLONE (MYCOLOG II) CREAM    Apply to affected area 2 times daily    PROPRANOLOL (INDERAL) 20 MG TABLET    Take 20 mg by mouth 2 (two) times daily.    SPIRONOLACTONE (ALDACTONE) 25 MG TABLET       Changed and/or Refilled Medications    Modified Medication Previous Medication    FAMOTIDINE (PEPCID) 20 MG TABLET famotidine (PEPCID) 20 MG tablet       Take 1 tablet (20 mg total) by mouth once daily.    Take 1 tablet by mouth once daily.    PANTOPRAZOLE (PROTONIX) 40 MG TABLET pantoprazole (PROTONIX) 40 MG tablet       Take 1 tablet (40 mg total) by mouth 2 (two) times daily.    Take 1 tablet (40 mg total) by mouth 2 (two) times daily.   Discontinued Medications    NYSTATIN (MYCOSTATIN) CREAM    Apply topically 2 (two) times daily.         Vital Signs:  /74 (BP Location: Right arm, Patient Position: Sitting)   Pulse 73   Ht 5' 6" (1.676 m)   Wt 94.3 kg (207 lb 12.8 oz)   SpO2 96%   BMI 33.54 kg/m²        Physical Exam  Vitals " reviewed.   Constitutional:       General: She is awake. She is not in acute distress.     Appearance: Normal appearance. She is well-developed. She is not ill-appearing, toxic-appearing or diaphoretic.   HENT:      Head: Normocephalic and atraumatic.      Nose: Nose normal.      Mouth/Throat:      Mouth: Mucous membranes are moist.      Pharynx: Oropharynx is clear. No oropharyngeal exudate or posterior oropharyngeal erythema.   Eyes:      General: Lids are normal. Gaze aligned appropriately. No scleral icterus.        Right eye: No discharge.         Left eye: No discharge.      Conjunctiva/sclera: Conjunctivae normal.   Neck:      Trachea: Trachea normal.   Cardiovascular:      Rate and Rhythm: Normal rate and regular rhythm.      Pulses:           Radial pulses are 2+ on the right side and 2+ on the left side.   Pulmonary:      Effort: Pulmonary effort is normal. No respiratory distress.      Breath sounds: No stridor. No wheezing.   Chest:      Chest wall: No tenderness.   Abdominal:      General: Bowel sounds are normal. There is no distension.      Palpations: Abdomen is soft. There is no fluid wave, hepatomegaly or mass.      Tenderness: There is no abdominal tenderness. There is no guarding or rebound.   Musculoskeletal:         General: No tenderness or deformity.      Cervical back: No tenderness.      Right lower leg: No edema.      Left lower leg: No edema.   Lymphadenopathy:      Cervical: No cervical adenopathy.   Skin:     General: Skin is warm and dry.      Capillary Refill: Capillary refill takes less than 2 seconds.      Coloration: Skin is not cyanotic, jaundiced or pale.   Neurological:      General: No focal deficit present.      Mental Status: She is alert and oriented to person, place, and time.      Motor: No tremor.   Psychiatric:         Attention and Perception: Attention normal.         Mood and Affect: Mood and affect normal.         Speech: Speech normal.         Behavior: Behavior  normal. Behavior is cooperative.            All of the data above and below has been reviewed by myself and any further interpretations will be reflected in the assessment and plan.   The data includes review of external notes, and independent interpretation of lab results, procedures, x-rays, and imaging reports.      Assessment:  Gastric ulcer, unspecified chronicity, unspecified whether gastric ulcer hemorrhage or perforation present  -     Ambulatory Referral to External Surgery    Gastroesophageal reflux disease, unspecified whether esophagitis present  -     pantoprazole (PROTONIX) 40 MG tablet; Take 1 tablet (40 mg total) by mouth 2 (two) times daily.  Dispense: 180 tablet; Refill: 4  -     Ambulatory Referral to External Surgery    Epigastric pain    Anemia, unspecified type  -     Ambulatory Referral to External Surgery    Screening for colon cancer    NSAID long-term use    Other orders  -     famotidine (PEPCID) 20 MG tablet; Take 1 tablet (20 mg total) by mouth once daily.  Dispense: 90 tablet; Refill: 4    Gastric ulcers on EGD 2022. On panto 40 BID. Still taking NSAIDs. Needs repeat EGD to ensure ulcers have healed. Goal of getting off NSAIDs completely. Will keep on panto 40 BID at this time as it is helping reflux and epigastric pain.  Colon due 2032.     Recommendations:    Schedule upper endoscopy.   Continue pantoprazole 40 mg twice a day. Continue famotidine 20 mg daily.  Discontinue all NSAIDs.    Risks, benefits, and alternatives of medical management, any associated procedures, and/or treatment discussed with the patient. Patient given opportunity to ask questions and voices understanding. Patient has elected to proceed with the recommended care modalities as discussed.    Follow up in about 1 year (around 12/18/2025).    Order summary:  Orders Placed This Encounter   Procedures    Ambulatory Referral to External Surgery        Instructed patient to notify my office if they have not been  contacted within two weeks after any procedures, submitting any samples (biopsies, blood, stool, urine, etc.) or after any imaging (X-ray, CT, MRI, etc.).      Staci Ho NP    This document may have been created using a voice recognition transcribing system. Incorrect words or phrases may have been missed during proofreading. Please interpret accordingly or contact me for clarification.

## 2024-12-18 NOTE — LETTER
December 18, 2024        Ivania Jackson MD  [address] [place] [address]             [facility]  [address] [place] [address]  Phone: [phone]  Fax: [phone]   Patient: Brianna Summers   MR Number: [id number]   YOB: 1970   Date of Visit: 12/18/2024       Dear Dr. Jackson:    Thank you for referring Brianna Summers to me for evaluation. Attached you will find relevant portions of my assessment and plan of care.    If you have questions, please do not hesitate to call me. I look forward to following Brianna Summers along with you.    Sincerely,      Staci Ho, NP            CC  No Recipients    Enclosure

## 2025-05-07 ENCOUNTER — TELEPHONE (OUTPATIENT)
Dept: GASTROENTEROLOGY | Facility: CLINIC | Age: 55
End: 2025-05-07
Payer: COMMERCIAL

## 2025-05-07 DIAGNOSIS — K25.9 GASTRIC ULCER, UNSPECIFIED CHRONICITY, UNSPECIFIED WHETHER GASTRIC ULCER HEMORRHAGE OR PERFORATION PRESENT: Primary | ICD-10-CM

## 2025-05-07 DIAGNOSIS — K21.9 GASTROESOPHAGEAL REFLUX DISEASE, UNSPECIFIED WHETHER ESOPHAGITIS PRESENT: ICD-10-CM

## 2025-05-07 DIAGNOSIS — D64.9 ANEMIA, UNSPECIFIED TYPE: ICD-10-CM

## 2025-05-07 NOTE — TELEPHONE ENCOUNTER
S/w pt and told her that I was calling as a courtesy regarding up coming EGD with [facility] on 5/15/25, Thurs and wanted to verify that he has her paper prep instructions. I also mentioned that [facility] will call the day before (WED) with the arrival time, GI Lab is located on the third floor, and to pre-register before next Wed. valeria

## 2025-05-07 NOTE — TELEPHONE ENCOUNTER
"  [facility]      Provider: Izabela Carroll MD    Patient Name: Brianna LIGHT (age):1970  54 y.o.           Gender: female   Phone: [phone]     Referring Physician: Ivania Jackson     Vital Signs:   Height - 5' 6"  Weight - 207 lb    Plan: EGD @ [facility]    Encounter Diagnoses   Name Primary?    Gastric ulcer, unspecified chronicity, unspecified whether gastric ulcer hemorrhage or perforation present Yes    Gastroesophageal reflux disease, unspecified whether esophagitis present     Anemia, unspecified type          History:      Past Medical History:   Diagnosis Date    Asthma     CHF (congestive heart failure)     GERD (gastroesophageal reflux disease)     High blood pressure     History of pericarditis     Hyperparathyroidism, unspecified     Migraines     Neuropathy     Obesity, Class I, BMI 30.0-34.9 (see actual BMI)     Osteopenia     Peripheral vascular disease, unspecified     Rheumatoid arthritis, unspecified     Scoliosis     Unspecified osteoarthritis, unspecified site       Past Surgical History:   Procedure Laterality Date     SECTION      twice    CHOLECYSTECTOMY      COLECTOMY      partial, bowel obstruction    COLONOSCOPY      HYSTERECTOMY      KNEE CARTILAGE SURGERY Left     TONSILLECTOMY AND ADENOIDECTOMY      UPPER GASTROINTESTINAL ENDOSCOPY      VENOUS ABLATION        Medication List with Changes/Refills   Current Medications    ALBUTEROL (PROVENTIL/VENTOLIN HFA) 90 MCG/ACTUATION INHALER        ESTRADIOL (ESTRACE) 0.01 % (0.1 MG/GRAM) VAGINAL CREAM    Place 1 g vaginally once daily.    FAMOTIDINE (PEPCID) 20 MG TABLET    Take 1 tablet (20 mg total) by mouth once daily.    FUROSEMIDE (LASIX) 40 MG TABLET    Take 1 tablet by mouth once daily.    IBUPROFEN (ADVIL,MOTRIN) 200 MG TABLET    Take 200 mg by mouth every 6 (six) hours as needed.    NYSTATIN-TRIAMCINOLONE (MYCOLOG II) " CREAM    Apply to affected area 2 times daily    PANTOPRAZOLE (PROTONIX) 40 MG TABLET    Take 1 tablet (40 mg total) by mouth 2 (two) times daily.    PROPRANOLOL (INDERAL) 20 MG TABLET    Take 20 mg by mouth 2 (two) times daily.    SPIRONOLACTONE (ALDACTONE) 25 MG TABLET          Review of patient's allergies indicates:   Allergen Reactions    Imitrex [sumatriptan]       Family History   Problem Relation Name Age of Onset    Throat cancer Father      Lung cancer Father      Esophageal cancer Father      Ulcerative colitis Sister      Crohn's disease Maternal Aunt      Liver cancer Maternal Grandmother      Tuberculosis Maternal Grandfather      Colon cancer Neg Hx        Social History[1]        [1]   Social History  Tobacco Use    Smoking status: Never    Smokeless tobacco: Never   Substance Use Topics    Alcohol use: Never    Drug use: Never

## 2025-05-15 ENCOUNTER — OUTSIDE PLACE OF SERVICE (OUTPATIENT)
Dept: GASTROENTEROLOGY | Facility: CLINIC | Age: 55
End: 2025-05-15

## 2025-05-15 ENCOUNTER — TELEPHONE (OUTPATIENT)
Dept: GASTROENTEROLOGY | Facility: CLINIC | Age: 55
End: 2025-05-15

## 2025-05-15 DIAGNOSIS — K21.9 GASTROESOPHAGEAL REFLUX DISEASE, UNSPECIFIED WHETHER ESOPHAGITIS PRESENT: ICD-10-CM

## 2025-05-15 RX ORDER — PANTOPRAZOLE SODIUM 40 MG/1
40 TABLET, DELAYED RELEASE ORAL 2 TIMES DAILY
Qty: 180 TABLET | Refills: 0 | Status: SHIPPED | OUTPATIENT
Start: 2025-05-15

## 2025-05-16 ENCOUNTER — TELEPHONE (OUTPATIENT)
Dept: GASTROENTEROLOGY | Facility: CLINIC | Age: 55
End: 2025-05-16
Payer: COMMERCIAL

## 2025-05-16 NOTE — TELEPHONE ENCOUNTER
Pt was calling to see why her ins denied her pantoprazole.  She stated someone called her about this already and will let her know something today... MIKEN

## 2025-05-16 NOTE — TELEPHONE ENCOUNTER
----- Message from Sharifacamillamarylou sent at 5/16/2025 12:37 PM CDT -----  Contact: KRISSY BACK [[id number]]  ..Type:  Patient Requesting CallWho Called:KRISSY BACK [[id number]]Does the patient know what this is regarding?:pantoprazole (PROTONIX) 40 MG tabletWould the patient rather a call back or a response via [facility]? Call Best Call Back Number:[phone] Additional Information: Is this a 3 month or 6 month prescription- do you need to let the insurance company know you increased it?Denied claim Patient had to pay out of pocket, they rejected it

## 2025-05-16 NOTE — TELEPHONE ENCOUNTER
Spoke with the patient, pharmacy told her that b/c the rx for pantoprazole changed her insurance won't cover it and she paid out of pocket. After a deep dive the rx has not changed. I informed her that she'd need to speak with her insurance and that it's likely the tech missed something. Patient will call her insurance to see what's going on.

## 2025-05-16 NOTE — TELEPHONE ENCOUNTER
----- Message from Sharifacamillamarylou sent at 5/16/2025 12:37 PM CDT -----  Contact: KRISSY BACK [[id number]]  ..Type:  Patient Requesting CallWho Called:KRISSY BACK [[id number]]Does the patient know what this is regarding?:pantoprazole (PROTONIX) 40 MG tabletWould the patient rather a call back or a response via [facility]? Call Best Call Back Number:[phone] Additional Information: Is this a 3 month or 6 month prescription- do you need to let the insurance company know you increased it?Denied claim Patient had to pay out of pocket, they rejected it

## 2025-05-23 ENCOUNTER — TELEPHONE (OUTPATIENT)
Dept: GASTROENTEROLOGY | Facility: CLINIC | Age: 55
End: 2025-05-23
Payer: COMMERCIAL

## 2025-05-23 ENCOUNTER — RESULTS FOLLOW-UP (OUTPATIENT)
Dept: GASTROENTEROLOGY | Facility: CLINIC | Age: 55
End: 2025-05-23

## 2025-05-23 DIAGNOSIS — K25.9 GASTRIC ULCER, UNSPECIFIED CHRONICITY, UNSPECIFIED WHETHER GASTRIC ULCER HEMORRHAGE OR PERFORATION PRESENT: Primary | ICD-10-CM

## 2025-05-23 NOTE — TELEPHONE ENCOUNTER
Copied from CRM #[id number]. Topic: General Inquiry - Return Call  >> May 23, 2025 10:54 AM Quynh wrote:  Type:  Patient Returning Call    Who Called:pt  Who Left Message for Patient:Analia  Does the patient know what this is regarding?:missed call  Would the patient rather a call back or a response via [facility]? Call back  Best Call Back Number:[phone] (home) [phone] (work)  Additional Information: none

## 2025-05-23 NOTE — TELEPHONE ENCOUNTER
AUBx ero gitis, GBx wnl.    Notify patient that her stomach ulcer Bx were benign.  Did she begin the panto 40 BID and have her GI Sx improved? She should avoid all NSAIDs and have a repeat upper endoscopy to ensure this ulcer heals to confirm it is benign.  Add on to procedure schedule June 30, July 2 or July 3 of 2025. Order signed.  NBP

## 2025-05-23 NOTE — TELEPHONE ENCOUNTER
Updated Epic procedure schedule and added EGD to 7/2/25, per [facility]. Faxed order to [facility], pending PA, if needed.    Called pt and left v/m telling her that I have added her to [facility] 7/2/25 procedure schedule and wanted to verify she has prep instructions.  I informed her that I would call the week before the procedure to make sure she has everything. I also reiterated [facility] chart remarks as to why she is wanting to repeat the EGD. I told pt to call back if she has any additional questions or if she needs prep instructions. valeria

## 2025-05-23 NOTE — TELEPHONE ENCOUNTER
Pt notified and stated she's having really bad ulcer issues and the medication isnt working. Pt stated she don't want to keep doing scopes checking the ulcer when the medication isnt working. Pt is asking if you can't please change her medication so she can try to get some relief and results.  KENDRA    I sent [facility] a staff message.. KENDRA

## 2025-05-23 NOTE — TELEPHONE ENCOUNTER
Patient called to ask about her pantoprazole rx as she is still taking the 40mg BID but still having symptoms px, nausea, indigestion. Additionally I relayed her results per Dr. Carroll notations. Patient is still having confusion and frustration with the rx being denied per pharmacy ''change in rx so insurance won't cover it'' after researching chart she was already taking 40mg BID per OV 12/18 additionally she states Dr. Carroll verbally told her at the EGD she was increasing what she thought was the pantoprazole but it's the same rx from before. I did see famotidine previously was 20mg daily then BID however patient states she was verbally told to discontinue at EGD but don't see notation of that in EGD note available. Patient is requesting response to be put in portal if over the weekend. Patient picked July 2 for f/u EGD, I need override permissions, will forward. Lastly patient is also inquiring if this will be covered by her insurance for secondary procedure. I informed the patient that I am not versed in all things insurance but it's deemed necessary to confirm benign status and healing but that I would forward to our staff and she should also check with her insurance.

## 2025-05-23 NOTE — TELEPHONE ENCOUNTER
Copied from CRM #[id number]. Topic: Medications - New Medication Request  >> May 23, 2025  8:37 AM Analia wrote:  Type:  RX Refill Request    Who Called: pt  Refill or New Rx:new  Preferred Pharmacy with phone number:  [id number] - [place], [place] - [address]  [address]  [place] [address]  Phone: [phone] Fax: [phone]  Local or Mail Order:local  Ordering Provider:Glen  Would the patient rather a call back or a response via [facility]? call  Best Call Back Number:[phone]  Additional Information: requesting to speak with office regarding alternative medication for ulcer and indigestion

## 2025-05-24 NOTE — TELEPHONE ENCOUNTER
I assume that means she is on panto 40 BID and the pain is still present off NSAIDs.  We may need to change her medicine and are sometimes limited by insurance coverage.  Will consider Voquezna (may have to give samples if we have some available).  Have patient added to the end of my clinic Tuesday, May 27, 2025.  Ask her to arrive at 2:45pm even though she my be listed as 2pm in the computer.  NBP

## 2025-05-26 NOTE — TELEPHONE ENCOUNTER
Spoke with patient, she stated that she needs to look at her schedule up to 1 month in advanced. She will look at her work schedule tomorrow during business hours and call us back.  -DAMEON,LPN

## 2025-05-26 NOTE — TELEPHONE ENCOUNTER
Staff spoke to pt.. she declined the appt for tomorrow due to work schedule. She stated she have to schedule the appt because her job is high demand and she can't just take off short notice.  Pt asked me to cancel the 7/2/25 procedure because she's not sure why she need it if the medication isnt working.  I advised her this is why the appt is being made so she can get the meds changed and talk about procedure.  She stated she can't come in.  I told her I would let [facility] know and get back with her.   KENDRA

## 2025-05-26 NOTE — TELEPHONE ENCOUNTER
I am not sure how much time ahead she needs for an office visit with her work, so whichever day I have clinic she may be added as the last patient arriving at 2:45pm.  JANELL

## 2025-05-27 ENCOUNTER — TELEPHONE (OUTPATIENT)
Dept: GASTROENTEROLOGY | Facility: CLINIC | Age: 55
End: 2025-05-27

## 2025-05-28 NOTE — TELEPHONE ENCOUNTER
Awaiting patient call back on what clinic date she can be added on to my clinic (last patient arriving at 2:45pm even if listed as 2pm). See notes in 5/23/2025 Results Follow-Up encounter.  Will have staff remove from 7/2/2025 procedure schedule per patient request.  [employer] denial of repeat EGD under Media tab and reviewed. Will plan to resubmit and expect approval after 8-12 weeks of ulcer therapy.  JANELL   Staging Info: By selecting yes to the question above you will include information on AJCC 8 tumor staging in your Mohs note. Information on tumor staging will be automatically added for SCCs on the head and neck. AJCC 8 includes tumor size, tumor depth, perineural involvement and bone invasion.

## 2025-06-27 NOTE — TELEPHONE ENCOUNTER
"Staff spoke to pt.. she stated " my insurance told me [facility] owes me money because I didn't have to pay as much for the procedure as I was charged.  So until I get my money back I'm not scheduling any appts."  Staff advised her any billing questions will have to go through [facility] or [facility] billing dept.. staff gave her the number to billing  [phone] and the main number to [facility] to call billing for this matter... LDN  "